# Patient Record
Sex: MALE | Race: WHITE | NOT HISPANIC OR LATINO | Employment: UNEMPLOYED | URBAN - METROPOLITAN AREA
[De-identification: names, ages, dates, MRNs, and addresses within clinical notes are randomized per-mention and may not be internally consistent; named-entity substitution may affect disease eponyms.]

---

## 2017-12-12 ENCOUNTER — RESULTS ENCOUNTER (OUTPATIENT)
Dept: INTERNAL MEDICINE | Facility: CLINIC | Age: 67
End: 2017-12-12

## 2017-12-12 ENCOUNTER — OFFICE VISIT (OUTPATIENT)
Dept: INTERNAL MEDICINE | Facility: CLINIC | Age: 67
End: 2017-12-12

## 2017-12-12 VITALS
DIASTOLIC BLOOD PRESSURE: 76 MMHG | SYSTOLIC BLOOD PRESSURE: 120 MMHG | OXYGEN SATURATION: 96 % | HEIGHT: 69 IN | RESPIRATION RATE: 16 BRPM | TEMPERATURE: 98.2 F | BODY MASS INDEX: 27.73 KG/M2 | WEIGHT: 187.2 LBS | HEART RATE: 78 BPM

## 2017-12-12 DIAGNOSIS — Z12.11 SCREEN FOR COLON CANCER: ICD-10-CM

## 2017-12-12 DIAGNOSIS — Z00.00 ENCOUNTER FOR PREVENTIVE HEALTH EXAMINATION: Primary | ICD-10-CM

## 2017-12-12 DIAGNOSIS — Z13.220 SCREENING FOR LIPID DISORDERS: ICD-10-CM

## 2017-12-12 DIAGNOSIS — Z11.59 NEED FOR HEPATITIS C SCREENING TEST: ICD-10-CM

## 2017-12-12 DIAGNOSIS — M12.811 ROTATOR CUFF ARTHROPATHY, RIGHT: ICD-10-CM

## 2017-12-12 DIAGNOSIS — N40.0 BENIGN PROSTATIC HYPERPLASIA WITHOUT LOWER URINARY TRACT SYMPTOMS: ICD-10-CM

## 2017-12-12 DIAGNOSIS — Q89.01 ASPLENIA: ICD-10-CM

## 2017-12-12 LAB
ALBUMIN SERPL-MCNC: 4.6 G/DL (ref 3.5–5.2)
ALBUMIN/GLOB SERPL: 1.4 G/DL
ALP SERPL-CCNC: 92 U/L (ref 39–117)
ALT SERPL-CCNC: 12 U/L (ref 1–41)
AST SERPL-CCNC: 15 U/L (ref 1–40)
BASOPHILS # BLD AUTO: 0.05 10*3/MM3 (ref 0–0.2)
BASOPHILS NFR BLD AUTO: 0.4 % (ref 0–1.5)
BILIRUB SERPL-MCNC: 0.6 MG/DL (ref 0.1–1.2)
BUN SERPL-MCNC: 20 MG/DL (ref 8–23)
BUN/CREAT SERPL: 21.7 (ref 7–25)
CALCIUM SERPL-MCNC: 9.8 MG/DL (ref 8.6–10.5)
CHLORIDE SERPL-SCNC: 102 MMOL/L (ref 98–107)
CHOLEST SERPL-MCNC: 180 MG/DL (ref 0–200)
CO2 SERPL-SCNC: 25.2 MMOL/L (ref 22–29)
CREAT SERPL-MCNC: 0.92 MG/DL (ref 0.76–1.27)
EOSINOPHIL # BLD AUTO: 0.14 10*3/MM3 (ref 0–0.7)
EOSINOPHIL NFR BLD AUTO: 1.2 % (ref 0.3–6.2)
ERYTHROCYTE [DISTWIDTH] IN BLOOD BY AUTOMATED COUNT: 13.1 % (ref 11.5–14.5)
GFR SERPLBLD CREATININE-BSD FMLA CKD-EPI: 82 ML/MIN/1.73
GFR SERPLBLD CREATININE-BSD FMLA CKD-EPI: 99 ML/MIN/1.73
GLOBULIN SER CALC-MCNC: 3.3 GM/DL
GLUCOSE SERPL-MCNC: 78 MG/DL (ref 65–99)
HCT VFR BLD AUTO: 46.3 % (ref 40.4–52.2)
HDLC SERPL-MCNC: 41 MG/DL (ref 40–60)
HGB BLD-MCNC: 15.1 G/DL (ref 13.7–17.6)
IMM GRANULOCYTES # BLD: 0.02 10*3/MM3 (ref 0–0.03)
IMM GRANULOCYTES NFR BLD: 0.2 % (ref 0–0.5)
LDLC SERPL CALC-MCNC: 120 MG/DL (ref 0–100)
LDLC/HDLC SERPL: 2.92 {RATIO}
LYMPHOCYTES # BLD AUTO: 4.03 10*3/MM3 (ref 0.9–4.8)
LYMPHOCYTES NFR BLD AUTO: 34.7 % (ref 19.6–45.3)
MCH RBC QN AUTO: 31.9 PG (ref 27–32.7)
MCHC RBC AUTO-ENTMCNC: 32.6 G/DL (ref 32.6–36.4)
MCV RBC AUTO: 97.9 FL (ref 79.8–96.2)
MONOCYTES # BLD AUTO: 1.17 10*3/MM3 (ref 0.2–1.2)
MONOCYTES NFR BLD AUTO: 10.1 % (ref 5–12)
NEUTROPHILS # BLD AUTO: 6.22 10*3/MM3 (ref 1.9–8.1)
NEUTROPHILS NFR BLD AUTO: 53.4 % (ref 42.7–76)
PLATELET # BLD AUTO: 328 10*3/MM3 (ref 140–500)
POTASSIUM SERPL-SCNC: 4.4 MMOL/L (ref 3.5–5.2)
PROT SERPL-MCNC: 7.9 G/DL (ref 6–8.5)
PSA SERPL-MCNC: 1.01 NG/ML (ref 0–4)
RBC # BLD AUTO: 4.73 10*6/MM3 (ref 4.6–6)
SODIUM SERPL-SCNC: 141 MMOL/L (ref 136–145)
TRIGL SERPL-MCNC: 96 MG/DL (ref 0–150)
VLDLC SERPL CALC-MCNC: 19.2 MG/DL (ref 5–40)
WBC # BLD AUTO: 11.63 10*3/MM3 (ref 4.5–10.7)

## 2017-12-12 PROCEDURE — 90670 PCV13 VACCINE IM: CPT | Performed by: INTERNAL MEDICINE

## 2017-12-12 PROCEDURE — G0439 PPPS, SUBSEQ VISIT: HCPCS | Performed by: INTERNAL MEDICINE

## 2017-12-12 PROCEDURE — 99387 INIT PM E/M NEW PAT 65+ YRS: CPT | Performed by: INTERNAL MEDICINE

## 2017-12-12 PROCEDURE — G0009 ADMIN PNEUMOCOCCAL VACCINE: HCPCS | Performed by: INTERNAL MEDICINE

## 2017-12-12 NOTE — PROGRESS NOTES
QUICK REFERENCE INFORMATION:  The ABCs of the Annual Wellness Visit    Initial Medicare Wellness Visit    HEALTH RISK ASSESSMENT    1950    Recent Hospitalizations:  No hospitalization(s) within the last year..        Current Medical Providers:  Patient Care Team:  Jake Gongora MD as PCP - Internal Medicine (Internal Medicine)        Smoking Status:  History   Smoking Status   • Never Smoker   Smokeless Tobacco   • Never Used       Alcohol Consumption:  History   Alcohol Use   • Yes     Comment: 2 drinks x month on avg       Depression Screen:   PHQ-2/PHQ-9 Depression Screening 12/12/2017   Little interest or pleasure in doing things 0   Feeling down, depressed, or hopeless 0   Trouble falling or staying asleep, or sleeping too much 0   Feeling tired or having little energy 0   Poor appetite or overeating 0   Feeling bad about yourself - or that you are a failure or have let yourself or your family down 0   Trouble concentrating on things, such as reading the newspaper or watching television 0   Moving or speaking so slowly that other people could have noticed. Or the opposite - being so fidgety or restless that you have been moving around a lot more than usual 0   Thoughts that you would be better off dead, or of hurting yourself in some way 0   Total Score 0       Health Habits and Functional and Cognitive Screening:  Functional & Cognitive Status 12/12/2017   Do you have difficulty preparing food and eating? No   Do you have difficulty bathing yourself, getting dressed or grooming yourself? No   Do you have difficulty using the toilet? No   Do you have difficulty moving around from place to place? No   Do you have trouble with steps or getting out of a bed or a chair? No   In the past year have you fallen or experienced a near fall? No   Current Diet Well Balanced Diet   Dental Exam Not up to date   Eye Exam Up to date   Exercise (times per week) 7 times per week   Current Exercise Activities Include  Walking   Do you need help using the phone?  No   Are you deaf or do you have serious difficulty hearing?  No   Do you need help with transportation? No   Do you need help shopping? No   Do you need help preparing meals?  No   Do you need help with housework?  No   Do you need help with laundry? No   Do you need help taking your medications? No   Do you need help managing money? No   Have you felt unusual stress, anger or loneliness in the last month? No   Who do you live with? Alone   If you need help, do you have trouble finding someone available to you? No   Have you been bothered in the last four weeks by sexual problems? No   Do you have difficulty concentrating, remembering or making decisions? No           Does the patient have evidence of cognitive impairment? No    Asiprin use counseling: Does not need ASA (and currently is not on it)      Recent Lab Results:    Visual Acuity:  No exam data present    Age-appropriate Screening Schedule:  Refer to the list below for future screening recommendations based on patient's age, sex and/or medical conditions. Orders for these recommended tests are listed in the plan section. The patient has been provided with a written plan.    Health Maintenance   Topic Date Due   • PNEUMOCOCCAL VACCINES (65+ LOW/MEDIUM RISK) (1 of 2 - PCV13) 12/31/2014   • ZOSTER VACCINE  12/12/2017   • INFLUENZA VACCINE  Addressed   • TDAP/TD VACCINES  Excluded        Subjective   History of Present Illness    Brijesh Farmer is a 66 y.o. male who presents for an Annual Wellness Visit.    The following portions of the patient's history were reviewed and updated as appropriate: allergies, current medications, past family history, past medical history, past social history, past surgical history and problem list.    No outpatient prescriptions prior to visit.     No facility-administered medications prior to visit.        Patient Active Problem List   Diagnosis   • Asplenia   • Benign prostatic  "hyperplasia without lower urinary tract symptoms       Advance Care Planning:  has NO advance directive - information provided to the patient today    Identification of Risk Factors:  Risk factors include: No risk factors apply.    Review of Systems    Compared to one year ago, the patient feels his physical health is the same.  Compared to one year ago, the patient feels his mental health is the same.    Objective     Physical Exam    Vitals:    12/12/17 1330   BP: 120/76   BP Location: Left arm   Patient Position: Sitting   Pulse: 78   Resp: 16   Temp: 98.2 °F (36.8 °C)   SpO2: 96%   Weight: 84.9 kg (187 lb 3.2 oz)   Height: 175.3 cm (69\")   PainSc: 0-No pain       Body mass index is 27.64 kg/(m^2).  Discussed the patient's BMI with him. BMI is within normal parameters. No follow-up required.    Assessment/Plan   Patient Self-Management and Personalized Health Advice  The patient has been provided with information about: designing advance directives and preventive services including:   · Advance directive.    Visit Diagnoses:    ICD-10-CM ICD-9-CM   1. Encounter for preventive health examination Z00.00 V70.0   2. Asplenia Q89.01 759.0   3. Rotator cuff arthropathy, right M12.811 716.81   4. Benign prostatic hyperplasia without lower urinary tract symptoms N40.0 600.00   5. Screening for lipid disorders Z13.220 V77.91   6. Need for hepatitis C screening test Z11.59 V73.89   7. Screen for colon cancer Z12.11 V76.51       Orders Placed This Encounter   Procedures   • Pneumococcal Conjugate Vaccine 13-Valent All   • Cologuard - Stool, Per Rectum     Standing Status:   Future     Standing Expiration Date:   12/12/2018   • Comprehensive Metabolic Panel   • PSA   • Lipid Panel With LDL / HDL Ratio   • PT Consult: Eval & Treat     Standing Status:   Future     Standing Expiration Date:   12/12/2018     Scheduling Instructions:      Right shoulder rotator cuff sx   • CBC & Differential       No outpatient encounter " prescriptions on file as of 12/12/2017.     No facility-administered encounter medications on file as of 12/12/2017.        Reviewed use of high risk medication in the elderly: yes  Reviewed for potential of harmful drug interactions in the elderly: yes    Follow Up:  Return in about 1 year (around 12/12/2018), or if symptoms worsen or fail to improve.     An After Visit Summary and PPPS with all of these plans were given to the patient.

## 2017-12-12 NOTE — PATIENT INSTRUCTIONS
Pneumococcal Conjugate Vaccine (PCV13)   1. Why get vaccinated?  Vaccination can protect both children and adults from pneumococcal disease.  Pneumococcal disease is caused by bacteria that can spread from person to person through close contact. It can cause ear infections, and it can also lead to more serious infections of the:  · Lungs (pneumonia),  · Blood (bacteremia), and  · Covering of the brain and spinal cord (meningitis).  Pneumococcal pneumonia is most common among adults. Pneumococcal meningitis can cause deafness and brain damage, and it kills about 1 child in 10 who get it.  Anyone can get pneumococcal disease, but children under 2 years of age and adults 65 years and older, people with certain medical conditions, and cigarette smokers are at the highest risk.  Before there was a vaccine, the [place] saw:  · more than 700 cases of meningitis,  · about 13,000 blood infections,  · about 5 million ear infections, and  · about 200 deaths  in children under 5 each year from pneumococcal disease. Since vaccine became available, severe pneumococcal disease in these children has fallen by 88%.  About 18,000 older adults die of pneumococcal disease each year in the [place].  Treatment of pneumococcal infections with penicillin and other drugs is not as effective as it used to be, because some strains of the disease have become resistant to these drugs. This makes prevention of the disease, through vaccination, even more important.  2. PCV13 vaccine  Pneumococcal conjugate vaccine (called PCV13) protects against 13 types of pneumococcal bacteria.  PCV13 is routinely given to children at 2, 4, 6, and 12-15 months of age. It is also recommended for children and adults 2 to 64 years of age with certain health conditions, and for all adults 65 years of age and older. Your doctor can give you details.  3. Some people should not get this vaccine  Anyone who has ever had a life-threatening allergic reaction  to a dose of this vaccine, to an earlier pneumococcal vaccine called PCV7, or to any vaccine containing diphtheria toxoid (for example, DTaP), should not get PCV13.  Anyone with a severe allergy to any component of PCV13 should not get the vaccine. Tell your doctor if the person being vaccinated has any severe allergies.  If the person scheduled for vaccination is not feeling well, your healthcare provider might decide to reschedule the shot on another day.  4. Risks of a vaccine reaction  With any medicine, including vaccines, there is a chance of reactions. These are usually mild and go away on their own, but serious reactions are also possible.  Problems reported following PCV13 varied by age and dose in the series. The most common problems reported among children were:  · About half became drowsy after the shot, had a temporary loss of appetite, or had redness or tenderness where the shot was given.  · About 1 out of 3 had swelling where the shot was given.  · About 1 out of 3 had a mild fever, and about 1 in 20 had a fever over 102.2°F.  · Up to about 8 out of 10 became fussy or irritable.  Adults have reported pain, redness, and swelling where the shot was given; also mild fever, fatigue, headache, chills, or muscle pain.  Young children who get PCV13 along with inactivated flu vaccine at the same time may be at increased risk for seizures caused by fever. Ask your doctor for more information.  Problems that could happen after any vaccine:  · People sometimes faint after a medical procedure, including vaccination. Sitting or lying down for about 15 minutes can help prevent fainting, and injuries caused by a fall. Tell your doctor if you feel dizzy, or have vision changes or ringing in the ears.  · Some older children and adults get severe pain in the shoulder and have difficulty moving the arm where a shot was given. This happens very rarely.  · Any medication can cause a severe allergic reaction. Such  reactions from a vaccine are very rare, estimated at about 1 in a million doses, and would happen within a few minutes to a few hours after the vaccination.  As with any medicine, there is a very small chance of a vaccine causing a serious injury or death.  The safety of vaccines is always being monitored. For more information, visit: www.cdc.gov/vaccinesafety/  5. What if there is a serious reaction?  What should I look for?  · Look for anything that concerns you, such as signs of a severe allergic reaction, very high fever, or unusual behavior.  Signs of a severe allergic reaction can include hives, swelling of the face and throat, difficulty breathing, a fast heartbeat, dizziness, and weakness-usually within a few minutes to a few hours after the vaccination.  What should I do?  · If you think it is a severe allergic reaction or other emergency that can't wait, call 9-1-1 or get the person to the nearest hospital. Otherwise, call your doctor.  Reactions should be reported to the Vaccine Adverse Event Reporting System (VAERS). Your doctor should file this report, or you can do it yourself through the VAERS web site at www.vaers.[facility].gov, or by calling 1-898.280.3887.  VAERS does not give medical advice.  6. The National Vaccine Injury Compensation Program  The National Vaccine Injury Compensation Program (VICP) is a federal program that was created to compensate people who may have been injured by certain vaccines.  Persons who believe they may have been injured by a vaccine can learn about the program and about filing a claim by calling 1-220.797.3791 or visiting the VICP website at www.hrsa.gov/vaccinecompensation. There is a time limit to file a claim for compensation.  7. How can I learn more?  · Ask your healthcare provider. He or she can give you the vaccine package insert or suggest other sources of information.  · Call your local or state health department.  · Contact the Centers for Disease Control and  Prevention (CDC):    Call 1-409.598.5889 (1-800-CDC-INFO) or    Visit CDC's website at www.cdc.gov/vaccines  Vaccine Information Statement  PCV13 Vaccine (11/05/2015)     This information is not intended to replace advice given to you by your health care provider. Make sure you discuss any questions you have with your health care provider.     Document Released: 10/15/2007 Document Revised: 01/08/2016 Document Reviewed: 11/12/2015  [employer] Interactive Patient Education ©2017 [employer]  Advance Directive  Advance directives are the legal documents that allow you to make choices about your health care and medical treatment if you cannot speak for yourself. Advance directives are a way for you to communicate your wishes to family, friends, and health care providers. The specified people can then convey your decisions about end-of-life care to avoid confusion if you should become unable to communicate.  Ideally, the process of discussing and writing advance directives should happen over time rather than making decisions all at once. Advance directives can be modified as your situation changes, and you can change your mind at any time, even after you have signed the advance directives. Each state has its own laws regarding advance directives.  You may want to check with your health care provider, , or state representative about the law in your state. Below are some examples of advance directives.  HEALTH CARE PROXY AND DURABLE POWER OF  FOR HEALTH CARE  A health care proxy is a person (agent) appointed to make medical decisions for you if you cannot. Generally, people choose someone they know well and trust to represent their preferences when they can no longer do so. You should be sure to ask this person for agreement to act as your agent. An agent may have to exercise judgment in the event of a medical decision for which your wishes are not known.  A durable power of  for health care is a  legal document that names your health care proxy. Depending on the laws in your state, after the document is written, it may also need to be:  · Signed.  · Notarized.  · Dated.  · Copied.  · Witnessed.  · Incorporated into your medical record.  You may also want to appoint someone to manage your financial affairs if you cannot. This is called a durable power of  for finances. It is a separate legal document from the durable power of  for health care. You may choose the same person or someone different from your health care proxy to act as your agent in financial matters.  LIVING WILL  A living will is a set of instructions documenting your wishes about medical care when you cannot care for yourself. It is used if you become:  · Terminally ill.  · Incapacitated.  · Unable to communicate.  · Unable to make decisions.  Items to consider in your living will include:  · The use or non-use of life-sustaining equipment, such as dialysis machines and breathing machines (ventilators).  · A do not resuscitate (DNR) order, which is the instruction not to use cardiopulmonary resuscitation (CPR) if breathing or heartbeat stops.  · Tube feeding.  · Withholding of food and fluids.  · Comfort (palliative) care when the goal becomes comfort rather than a cure.  · Organ and tissue donation.  A living will does not give instructions about distribution of your money and property if you should pass away. It is advisable to seek the expert advice of a  in drawing up a will regarding your possessions. Decisions about taxes, beneficiaries, and asset distribution will be legally binding. This process can relieve your family and friends of any burdens surrounding disputes or questions that may come up about the allocation of your assets.  DO NOT RESUSCITATE (DNR)  A do not resuscitate (DNR) order is a request to not have CPR in the event that your heart stops beating or you stop breathing. Unless given other  instructions, a health care provider will try to help any patient whose heart has stopped or who has stopped breathing.   This information is not intended to replace advice given to you by your health care provider. Make sure you discuss any questions you have with your health care provider.  Document Released: 03/26/2009 Document Revised: 04/10/2017 Document Reviewed: 05/07/2014  [employer] Interactive Patient Education © 2017 [employer]

## 2017-12-14 ENCOUNTER — HOSPITAL ENCOUNTER (OUTPATIENT)
Dept: PHYSICAL THERAPY | Facility: HOSPITAL | Age: 67
Setting detail: THERAPIES SERIES
Discharge: HOME | End: 2017-12-14
Attending: INTERNAL MEDICINE

## 2017-12-14 DIAGNOSIS — M75.41 IMPINGEMENT SYNDROME, SHOULDER, RIGHT: Primary | ICD-10-CM

## 2017-12-14 DIAGNOSIS — M25.511 RIGHT SHOULDER PAIN, UNSPECIFIED CHRONICITY: ICD-10-CM

## 2017-12-14 DIAGNOSIS — M12.811 ROTATOR CUFF ARTHROPATHY, RIGHT: ICD-10-CM

## 2017-12-14 PROCEDURE — 97161 PT EVAL LOW COMPLEX 20 MIN: CPT

## 2017-12-14 PROCEDURE — G8985 CARRY GOAL STATUS: HCPCS

## 2017-12-14 PROCEDURE — G8984 CARRY CURRENT STATUS: HCPCS

## 2017-12-14 NOTE — THERAPY EVALUATION
Outpatient Physical Therapy Ortho Initial Evaluation  [facility]     Patient Name: Brijesh Farmer  : 1950  MRN: [id number]  Today's Date: 2017      Visit Date: 2017    Patient Active Problem List   Diagnosis   • Asplenia   • Benign prostatic hyperplasia without lower urinary tract symptoms        Past Medical History:   Diagnosis Date   • Arm pain    • Vision changes    • Weight gain         Past Surgical History:   Procedure Laterality Date   • KNEE ACL RECONSTRUCTION Left        Visit Dx:     ICD-10-CM ICD-9-CM   1. Impingement syndrome, shoulder, right M75.41 726.2   2. Rotator cuff arthropathy, right M12.811 716.81   3. Right shoulder pain, unspecified chronicity M25.511 719.41             Patient History       17 1300          History    Chief Complaint Pain  -      Type of Pain Upper Extremity / Arm  -      Date Current Problem(s) Began --   began 6-9 months ago  -      Brief Description of Current Complaint Pain in biceps for many months.  Suspects when he descended spiral staircase he thought he was at the last step but wasn't and he fell hitting his shoulder on a hot tub.  Shoulder was no worse at the time but gradually began having pain that worsened.  Slept previously on R side but has not been as much recently because of pain--he has to be careful of where he places R shoulder because of the pain.  Activities he is avoiding with R shoulder: throwing a ball hurts--tends to avoid.  Felt some pain golfing.  Stopped doing 50 push-ups a day.  Does daily cross-body toe touches 100 times.  Had previously been more active and wants to be more active.  Reports a shoulder separation 35 years ago playing softball. Pt was  2 years ago.  -      Hand Dominance right-handed  -JA      Occupation/sports/leisure activities golfs, walks, likes to hike  -      Pain     Pain Location Shoulder   upper arm referral region  -      Pain at Present 0  -      Pain at Best  0  -JA      Pain at Worst 5  -JA      Pain Frequency Intermittent  -JA      What Performance Factors Make the Current Problem(s) WORSE? reaching back, reaching across  -JA      Is your sleep disturbed? Yes  -JA      Fall Risk Assessment    Any falls in the past year: Yes  -JA      Number of falls reported in the last 12 months 1  -JA      Factors that contributed to the fall: Other (comment)  -JA      Safety    Are you being hurt, hit, or frightened by anyone at home or in your life? No  -JA      Are you being neglected by a caregiver No  -JA        User Key  (r) = Recorded By, (t) = Taken By, (c) = Cosigned By    Initials Name Provider Type    ROSS Salguero, PT Physical Therapist                PT Ortho       12/14/17 1300    Posture/Observations    Alignment Options Scapular winging   R  -JA    Special Tests/Palpation    Special Tests/Palpation Shoulder  -JA    Shoulder Girdle Palpation    Shoulder Girdle Palpation? --   Supraspinatus tendon point tender  -JA    Shoulder Impingement/Rotator Cuff Special Tests    Steward-Dieter Test (RC Lesion vs. Bursitis) Right:;Positive   mildly  -    Neer Impingement Test (RC Lesion vs. Bursitis) Right:;Positive  -JA    Full Can Test (RC Lesion) Right:;Positive  -JA    Empty Can Test (RC Lesion) Right:;Positive  -JA    Drop Arm Test (Full Thickness RC Lesion) Right:;Negative  -JA    ROM (Range of Motion)    General ROM upper extremity range of motion deficits identified  -JA    Right Shoulder    Flexion AROM Deficit 125   L:145  -JA    Flexion PROM Deficit 140   L: 165  -JA    ABduction AROM Deficit 130   L:150  -JA    ABduction PROM Deficit 140   L 165  -JA    External Rotation AROM Deficit reaches C5   L WFL  -JA    External Rotation PROM Deficit 60   L75  -JA    Internal Rotation AROM Deficit L1   L T5  -JA    Internal Rotation PROM Deficit 55   L WNL  -JA    General UE Assessment    ROM shoulder, right: UE ROM deficit  -JA    MMT (Manual Muscle Testing)     General MMT Assessment upper extremity strength deficits identified  -    General MMT Assessment Detail R rhomboids NT couldn't position, mid T 3-, LT 3-   -JA    Right Shoulder    Flexion Gross Movement (3+/5) fair plus  -JA    ABduction Gross Movement (3+/5) fair plus  -JA    Int Rotation Gross Movement (3/5) fair  -JA    Ext Rotation Gross Movement (3/5) fair  -JA    Upper Extremity    Upper Ext Manual Muscle Testing right shoulder strength deficit  -      User Key  (r) = Recorded By, (t) = Taken By, (c) = Cosigned By    Initials Name Provider Type    ROSS Salguero, PT Physical Therapist                                  PT OP Goals       12/14/17 1600       PT Short Term Goals    STG Date to Achieve 12/28/17  -JA     STG 1 Patient will be independent and compliant with initial HEP   -     STG 1 Progress New  -     STG 2 Pt will demonstrate optimal shoulder-scapula postural alignment to reduce shoulder strain/pain.  -     STG 2 Progress New  -     STG 3 Pt will reports 10% decrease in pain at night.  -     STG 3 Progress New  -     Long Term Goals    LTG Date to Achieve 01/14/18  -JA     LTG 1 Pt will be independent with advanced HEP for shoulder strengthening/stabilization.  -JA     LTG 1 Progress New  -     LTG 2 Pt will report decrease in pain and sleep disruption at night by 50%.  -     LTG 2 Progress New  -     LTG 3 Education for posture and body mechanics with home and work to reduce strain on shoulder.  -     LTG 3 Progress New  -     LTG 4 Pt will be able to throw a ball with no more than 1/10 pain.  -     LTG 4 Progress New  -     LTG 5 Pt will be able to reach behind back for ease of dressing/grooming.  -     LTG 5 Progress New  -     Time Calculation    PT Goal Re-Cert Due Date 03/14/18  -       User Key  (r) = Recorded By, (t) = Taken By, (c) = Cosigned By    Initials Name Provider Type    ROSS Salguero, PT Physical Therapist                PT  Assessment/Plan       12/14/17 4411       PT Assessment    Functional Limitations Limitation in home management;Limitations in community activities;Limitations in functional capacity and performance;Performance in leisure activities;Performance in self-care ADL;Performance in sport activities  -ROSS     Impairments Pain;Range of motion;Muscle strength;Posture;Poor body mechanics  -ROSS     Assessment Comments Pt is a 67 y/o male with stable clinical presentation of R arm pain referred from [facility].  He has poor posture with R scapular winging, positive impingement signs, decreased A/PROM, and weakness in R shoulder.  His pain and decreased ROM limit his functional use of R shoulder for dressing/grooming and restricts his participation in social activities such as playing ball with grandchild or friends or other throwing sports.  He would benefit from skilled therapy services to address problem list and facilitate return to previous level of function.  -ROSS     Please refer to paper survey for additional self-reported information Yes  -ROSS     Rehab Potential Excellent  -ROSS     Patient/caregiver participated in establishment of treatment plan and goals Yes  -ROSS     Patient would benefit from skilled therapy intervention Yes  -ROSS     PT Plan    PT Frequency 1x/week  -ROSS     Predicted Duration of Therapy Intervention (days/wks) 6 weeks  -ROSS     Planned CPT's? PT EVAL MOD COMPLELITY: 52866;PT THER PROC EA 15 MIN: 97438;PT MANUAL THERAPY EA 15 MIN: 50896;PT NEUROMUSC RE-EDUCATION EA 15 MIN: 23877;PT SELF CARE/HOME MGMT/TRAIN EA 15: 97234;PT HOT OR COLD PACK TREAT MCARE;PT ELECTRICAL STIM UNATTEND:   -ROSS     PT Plan Comments review HEP, reinforce posture and scapular-humeral alignment, progress with scapular strengthening with resistance and wts, PNF, mods prn, manual techniques  -ROSS       User Key  (r) = Recorded By, (t) = Taken By, (c) = Cosigned By    Initials Name Provider Type    ROSS Salguero, PT Physical  Therapist                              Outcome Measure Options: Disabilities of the Arm, Shoulder, and Hand (DASH)  DASH  Open a tight or new jar.: No Difficulty  Write: No Difficulty  Turn a key: No Difficulty  Prepare a meal: No Difficulty  Push open a heavy door: No Difficulty  Place an object on a shelf above your head: Mild Difficulty  Do heavy household chores (e.g., wash walls, wash floors): No Difficulty  Garden or do yard work: No Difficulty  Make a bed: No Difficulty  Carry a shopping bag or briefcase: No Difficulty  Carry a heavy object (over 10 lbs): No Difficulty  Change a lightbulb overhead: No Difficulty  Wash or blow dry your hair: No Difficulty  Wash your back: Mild Difficulty  Put on a pullover sweater: No Difficulty  Use a knife to cut food: No Difficulty  Recreational activities in which require little effort (e.g., cardplaying, knitting, etc.): No Difficulty  Recreational activities in which you take some force or impact through your arm, should or hand (e.g. golf, hammering, tennis, etc.): Mild Difficulty  Recreational Activities in which you move your arm freely (e.g., frisbee, badminton, etc.): No Difficulty  Manage transportation needs (getting from one place to another): No Difficulty  Sexual Activities: No Difficulty  During the past week, to what extent has your arm, shoulder, or hand problem interfered with your normal social activites with family, friends, neighbors or groups?: Not at all  During the past week, were you limited in your work or other regular daily activities as a result of your arm, shoulder or hand problem?: Slightly Limited  Arm, Shoulder, or hand pain: None  Arm, shoulder or hand pain when you performed any specific activity: Mild  Tingling (pins and needles) in your arm, shoulder, or hand: None  Weakness in your arm, shoulder or hand: None  Stiffness in your arm, shoulder or hand: None  During the past week, how much difficulty have you had sleeping because of the  pain in your arm, shoulder or hand?: Moderate Difficiculty  I feel less capable, less confident or less useful because of my arm, shoulder or hand problem: Strongly disagree  DASH Sum : 37  Number of Questions Answered: 30  DASH Score: 5.83         Time Calculation:   Start Time: 1315  Stop Time: 1400  Time Calculation (min): 45 min     Therapy Charges for Today     Code Description Service Date Service Provider Modifiers Qty    63612839700 HC PT CARRY MOV HAND OBJ CURRENT 12/14/2017 Melyssa Salguero, PT GP, CI 1    43990798699 HC PT CARRY MOV HAND OBJ PROJECTED 12/14/2017 Melyssa Salguero, PT GP,  1    67396128239 HC PT EVAL LOW COMPLEXITY 3 12/14/2017 Melyssa Salguero, PT GP 1          PT G-Codes  PT Professional Judgement Used?: Yes  Outcome Measure Options: Disabilities of the Arm, Shoulder, and Hand (DASH)  Score: 5.83  Functional Limitation: Carrying, moving and handling objects  Carrying, Moving and Handling Objects Current Status (): At least 1 percent but less than 20 percent impaired, limited or restricted  Carrying, Moving and Handling Objects Goal Status (): 0 percent impaired, limited or restricted         Melyssa Salguero, PT  12/14/2017

## 2017-12-18 ENCOUNTER — HOSPITAL ENCOUNTER (OUTPATIENT)
Dept: PHYSICAL THERAPY | Facility: HOSPITAL | Age: 67
Setting detail: THERAPIES SERIES
Discharge: HOME | End: 2017-12-18

## 2017-12-18 DIAGNOSIS — M25.511 RIGHT SHOULDER PAIN, UNSPECIFIED CHRONICITY: ICD-10-CM

## 2017-12-18 DIAGNOSIS — M75.41 IMPINGEMENT SYNDROME, SHOULDER, RIGHT: Primary | ICD-10-CM

## 2017-12-18 DIAGNOSIS — M12.811 ROTATOR CUFF ARTHROPATHY, RIGHT: ICD-10-CM

## 2017-12-18 PROCEDURE — 97110 THERAPEUTIC EXERCISES: CPT

## 2017-12-18 PROCEDURE — 97140 MANUAL THERAPY 1/> REGIONS: CPT

## 2017-12-18 NOTE — THERAPY TREATMENT NOTE
Outpatient Physical Therapy Ortho Treatment Note  [facility]     Patient Name: Brijesh Farmer  : 1950  MRN: [id number]  Today's Date: 2017      Visit Date: 2017    Visit Dx:    ICD-10-CM ICD-9-CM   1. Impingement syndrome, shoulder, right M75.41 726.2   2. Rotator cuff arthropathy, right M12.811 716.81   3. Right shoulder pain, unspecified chronicity M25.511 719.41       Patient Active Problem List   Diagnosis   • Asplenia   • Benign prostatic hyperplasia without lower urinary tract symptoms        Past Medical History:   Diagnosis Date   • Arm pain    • Vision changes    • Weight gain         Past Surgical History:   Procedure Laterality Date   • KNEE ACL RECONSTRUCTION Left                              PT Assessment/Plan       17 1038       PT Assessment    Assessment Comments Added scap ret with GTB to HEP. Toleraint HEP well. Will consider adding weight to scap punch next vist  -WS       User Key  (r) = Recorded By, (t) = Taken By, (c) = Cosigned By    Initials Name Provider Type    SHAYNE Car PTA Physical Therapy Assistant                    Exercises       17 1015          Subjective Comments    Subjective Comments min shoulder pain  -      Subjective Pain    Able to rate subjective pain? yes  -WS      Subjective Pain Comment reports improvement  -      Exercise 1    Exercise Name 1 scap ret  -WS      Reps 1 10  -WS      Exercise 2    Exercise Name 2 shld rolls  -WS      Reps 2 10  -WS      Exercise 3    Exercise Name 3 S/L ER  -WS      Reps 3 10  -WS      Exercise 4    Exercise Name 4 shoulder punch  -      Reps 4 10  -WS      Exercise 5    Exercise Name 5 supine shoulder flex with cane  -      Reps 5 10  -        User Key  (r) = Recorded By, (t) = Taken By, (c) = Cosigned By    Initials Name Provider Type    SHAYNE Car PTA Physical Therapy Assistant                        Manual Rx (last 36 hours)      Manual Treatments       17  1036          Manual Rx 1    Manual Rx 1 Location PROM , mobs ,occilations  -      Manual Rx 2    Manual Rx 2 Location rhythmic stab at 90  -        User Key  (r) = Recorded By, (t) = Taken By, (c) = Cosigned By    Initials Name Provider Type    SHAYNE Car PTA Physical Therapy Assistant                PT OP Goals       12/18/17 1000       PT Short Term Goals    STG Date to Achieve 12/28/17  -     STG 1 Patient will be independent and compliant with initial HEP   -WS     STG 1 Progress Ongoing  -     STG 2 Pt will demonstrate optimal shoulder-scapula postural alignment to reduce shoulder strain/pain.  -WS     STG 2 Progress Ongoing  -     STG 3 Pt will reports 10% decrease in pain at night.  -     STG 3 Progress Ongoing  -     Long Term Goals    LTG Date to Achieve 01/14/18  -     LTG 1 Pt will be independent with advanced HEP for shoulder strengthening/stabilization.  -     LTG 1 Progress New  -     LTG 2 Pt will report decrease in pain and sleep disruption at night by 50%.  -     LTG 2 Progress New  -     LTG 3 Education for posture and body mechanics with home and work to reduce strain on shoulder.  -     LTG 3 Progress New  -     LTG 4 Pt will be able to throw a ball with no more than 1/10 pain.  -     LTG 4 Progress New  -     LTG 5 Pt will be able to reach behind back for ease of dressing/grooming.  -     LTG 5 Progress New  -       User Key  (r) = Recorded By, (t) = Taken By, (c) = Cosigned By    Initials Name Provider Type    SHAYNE Car PTA Physical Therapy Assistant          Therapy Education  Given: HEP, Symptoms/condition management, Pain management, Posture/body mechanics  Program: Reinforced  How Provided: Verbal  Provided to: Patient              Time Calculation:   Start Time: 1015  Stop Time: 1045  Time Calculation (min): 30 min    Therapy Charges for Today     Code Description Service Date Service Provider Modifiers Qty    01162556868 HC PT  MANUAL THERAPY EA 15 MIN 12/18/2017 Vimal Car PTA GP 1    [id number]  PT THER PROC EA 15 MIN 12/18/2017 Vimal Car PTA GP 1                    Vimal Car PTA  12/18/2017

## 2017-12-28 ENCOUNTER — HOSPITAL ENCOUNTER (OUTPATIENT)
Dept: PHYSICAL THERAPY | Facility: HOSPITAL | Age: 67
Setting detail: THERAPIES SERIES
Discharge: HOME | End: 2017-12-28

## 2017-12-28 DIAGNOSIS — M75.41 IMPINGEMENT SYNDROME, SHOULDER, RIGHT: Primary | ICD-10-CM

## 2017-12-28 DIAGNOSIS — M12.811 ROTATOR CUFF ARTHROPATHY, RIGHT: ICD-10-CM

## 2017-12-28 DIAGNOSIS — M25.511 RIGHT SHOULDER PAIN, UNSPECIFIED CHRONICITY: ICD-10-CM

## 2017-12-28 PROCEDURE — 97110 THERAPEUTIC EXERCISES: CPT

## 2017-12-28 NOTE — THERAPY TREATMENT NOTE
Outpatient Physical Therapy Ortho Treatment Note  [facility]     Patient Name: Brijesh Farmer  : 1950  MRN: [id number]  Today's Date: 2017      Visit Date: 2017    Visit Dx:    ICD-10-CM ICD-9-CM   1. Impingement syndrome, shoulder, right M75.41 726.2   2. Rotator cuff arthropathy, right M12.811 716.81   3. Right shoulder pain, unspecified chronicity M25.511 719.41       Patient Active Problem List   Diagnosis   • Asplenia   • Benign prostatic hyperplasia without lower urinary tract symptoms        Past Medical History:   Diagnosis Date   • Arm pain    • Vision changes    • Weight gain         Past Surgical History:   Procedure Laterality Date   • KNEE ACL RECONSTRUCTION Left                              PT Assessment/Plan       17 1259       PT Assessment    Assessment Comments patient able to sleep on right arm with no issues. Improving with postural awareness. Right shoulder IR at 90 painful and limited  -WS       User Key  (r) = Recorded By, (t) = Taken By, (c) = Cosigned By    Initials Name Provider Type    SHAYNE Car PTA Physical Therapy Assistant                    Exercises       17 1230          Subjective Comments    Subjective Comments no change in the shoulder  -WS      Subjective Pain    Able to rate subjective pain? yes  -WS      Pre-Treatment Pain Level 0  -WS      Exercise 1    Exercise Name 1 scap ret  -WS      Reps 1 15  -WS      Additional Comments GTB  -WS      Exercise 2    Exercise Name 2 shld rolls  -WS      Reps 2 15  -WS      Additional Comments 3#  -WS      Exercise 3    Exercise Name 3 S/L ER  -WS      Sets 3 2  -WS      Reps 3 10  -WS      Additional Comments 1#  -WS      Exercise 4    Exercise Name 4 shoulder punch  -WS      Reps 4 15  -WS      Additional Comments 3  -WS      Exercise 5    Exercise Name 5 supine shoulder flex with cane  -WS      Reps 5 10  -WS      Exercise 6    Exercise Name 6 UBE  -WS      Time (Minutes) 6 3  -WS         User Key  (r) = Recorded By, (t) = Taken By, (c) = Cosigned By    Initials Name Provider Type     Vimal Car PTA Physical Therapy Assistant                        Manual Rx (last 36 hours)      Manual Treatments       12/28/17 1100          Manual Rx 1    Manual Rx 1 Location PROM , mobs ,occilations  -      Manual Rx 2    Manual Rx 2 Location rhythmic stab at 90  -        User Key  (r) = Recorded By, (t) = Taken By, (c) = Cosigned By    Initials Name Provider Type     Vimal Car PTA Physical Therapy Assistant                PT OP Goals       12/28/17 1200       PT Short Term Goals    STG Date to Achieve 12/28/17  -WS     STG 1 Patient will be independent and compliant with initial HEP   -     STG 1 Progress Met  -     STG 2 Pt will demonstrate optimal shoulder-scapula postural alignment to reduce shoulder strain/pain.  -WS     STG 2 Progress Ongoing  -     STG 3 Pt will reports 10% decrease in pain at night.  -WS     STG 3 Progress Ongoing  -     Long Term Goals    LTG Date to Achieve 01/14/18  -     LTG 1 Pt will be independent with advanced HEP for shoulder strengthening/stabilization.  -WS     LTG 1 Progress New  -     LTG 2 Pt will report decrease in pain and sleep disruption at night by 50%.  -     LTG 2 Progress New  -     LTG 3 Education for posture and body mechanics with home and work to reduce strain on shoulder.  -     LTG 3 Progress New  -     LTG 4 Pt will be able to throw a ball with no more than 1/10 pain.  -     LTG 4 Progress New  -     LTG 5 Pt will be able to reach behind back for ease of dressing/grooming.  -     LTG 5 Progress New  -       User Key  (r) = Recorded By, (t) = Taken By, (c) = Cosigned By    Initials Name Provider Type    SHAYNE Car PTA Physical Therapy Assistant          Therapy Education  Given: HEP, Symptoms/condition management, Pain management, Posture/body mechanics  Program: Reinforced  How Provided:  Verbal  Provided to: Patient  Level of Understanding: Teach back education performed              Time Calculation:   Start Time: 1230  Stop Time: 1300  Time Calculation (min): 30 min    Therapy Charges for Today     Code Description Service Date Service Provider Modifiers Qty    34589259501 HC PT THER PROC EA 15 MIN 12/28/2017 Vimal Car PTA GP 2                    Vimal Car PTA  12/28/2017

## 2018-01-03 ENCOUNTER — HOSPITAL ENCOUNTER (OUTPATIENT)
Dept: PHYSICAL THERAPY | Facility: HOSPITAL | Age: 68
Setting detail: THERAPIES SERIES
Discharge: HOME | End: 2018-01-03

## 2018-01-03 DIAGNOSIS — M75.41 IMPINGEMENT SYNDROME, SHOULDER, RIGHT: Primary | ICD-10-CM

## 2018-01-03 DIAGNOSIS — M25.511 RIGHT SHOULDER PAIN, UNSPECIFIED CHRONICITY: ICD-10-CM

## 2018-01-03 DIAGNOSIS — M12.811 ROTATOR CUFF ARTHROPATHY, RIGHT: ICD-10-CM

## 2018-01-03 PROCEDURE — 97112 NEUROMUSCULAR REEDUCATION: CPT

## 2018-01-03 PROCEDURE — 97110 THERAPEUTIC EXERCISES: CPT

## 2018-01-03 PROCEDURE — 97140 MANUAL THERAPY 1/> REGIONS: CPT

## 2018-01-03 NOTE — THERAPY TREATMENT NOTE
Outpatient Physical Therapy Ortho Treatment Note  [facility]     Patient Name: Brijesh Farmer  : 1950  MRN: [id number]  Today's Date: 1/3/2018      Visit Date: 2018    Visit Dx:    ICD-10-CM ICD-9-CM   1. Impingement syndrome, shoulder, right M75.41 726.2   2. Rotator cuff arthropathy, right M12.811 716.81   3. Right shoulder pain, unspecified chronicity M25.511 719.41       Patient Active Problem List   Diagnosis   • Asplenia   • Benign prostatic hyperplasia without lower urinary tract symptoms        Past Medical History:   Diagnosis Date   • Arm pain    • Vision changes    • Weight gain         Past Surgical History:   Procedure Laterality Date   • KNEE ACL RECONSTRUCTION Left              PT Ortho       18 1200    Right Shoulder    Flexion AROM Deficit 135  -ROSS      User Key  (r) = Recorded By, (t) = Taken By, (c) = Cosigned By    Initials Name Provider Type    ROSS Salguero, PT Physical Therapist                            PT Assessment/Plan       18 1100       PT Assessment    Assessment Comments Pt was substituting with pec during serratus anterior punch in supine.  Pt has deficient awareness of substitution or compensation for shoulder and scapular weakness.  -ROSS       User Key  (r) = Recorded By, (t) = Taken By, (c) = Cosigned By    Initials Name Provider Type    ROSS Salguero, PT Physical Therapist                    Exercises       18 1100          Subjective Comments    Subjective Comments A little better, still hurts it I move it in certain directions.  Not as bad putting on coat. Can sleep on it.  -ROSS      Subjective Pain    Able to rate subjective pain? yes  -ROSS      Pre-Treatment Pain Level 1  -JA      Exercise 1    Exercise Name 1 scap ret  -JA      Reps 1 20  -JA      Additional Comments GTB  -JA      Exercise 2    Exercise Name 2 shld rolls  -ROSS      Cueing 2 Verbal  -JA      Reps 2 20  -JA      Additional Comments 3#  -JA      Exercise 3     Exercise Name 3 S/L ER  -JA      Cueing 3 Tactile  -JA      Sets 3 2  -JA      Reps 3 10  -JA      Additional Comments 2#  -JA      Exercise 4    Exercise Name 4 shoulder punch  -JA      Cueing 4 Tactile  -JA      Reps 4 15  -JA      Additional Comments 3#, guided pt's arm to reduce pec substitution  -JA      Exercise 5    Exercise Name 5 supine shoulder flex with cane  -JA      Cueing 5 Verbal  -JA      Reps 5 10  -JA      Exercise 6    Exercise Name 6 UBE  -JA      Time (Minutes) 6 4  -JA      Exercise 7    Exercise Name 7 Lat pull down  -JA      Cueing 7 Demo  -JA      Reps 7 10  -JA      Additional Comments green, limited shoulder to neutral instead of going into ext to prevent pain  -JA      Exercise 8    Exercise Name 8 Biceps curls with scap retract positioning  -JA      Cueing 8 Demo  -JA      Reps 8 20  -JA      Additional Comments 3#; to reinforce good posture  -JA      Exercise 9    Exercise Name 9 danae HZ abd w/yellow  -JA      Cueing 9 Demo  -JA      Reps 9 15  -JA      Additional Comments yellow  -JA        User Key  (r) = Recorded By, (t) = Taken By, (c) = Cosigned By    Initials Name Provider Type    ROSS Salguero, PT Physical Therapist                        Manual Rx (last 36 hours)      Manual Treatments       01/03/18 1100          Manual Rx 1    Manual Rx 1 Location R shoulder in supine  -JA      Manual Rx 1 Type PROM, gh jt mobs, oscillations, PNF D2  -JA      Manual Rx 2    Manual Rx 2 Location R shoulder SL  -JA      Manual Rx 2 Type pnf scap ret-protract, rhythmic stab at 90 in SL and in supine  -JA        User Key  (r) = Recorded By, (t) = Taken By, (c) = Cosigned By    Initials Name Provider Type    ROSS Salguero, PT Physical Therapist                PT OP Goals       01/03/18 1100       PT Short Term Goals    STG Date to Achieve 12/28/17  -JA     STG 1 Patient will be independent and compliant with initial HEP   -JA     STG 1 Progress Met  -JA     STG 2 Pt will  "demonstrate optimal shoulder-scapula postural alignment to reduce shoulder strain/pain.  -     STG 2 Progress Ongoing  -     STG 3 Pt will reports 10% decrease in pain at night.  -     STG 3 Progress Met  -     Long Term Goals    LTG Date to Achieve 01/14/18  -     LTG 1 Pt will be independent with advanced HEP for shoulder strengthening/stabilization.  -     LTG 1 Progress New  -     LTG 2 Pt will report decrease in pain and sleep disruption at night by 50%.  -     LTG 2 Progress New  -     LTG 3 Education for posture and body mechanics with home and work to reduce strain on shoulder.  -     LTG 3 Progress New  -     LTG 4 Pt will be able to throw a ball with no more than 1/10 pain.  -     LTG 4 Progress New  -     LTG 5 Pt will be able to reach behind back for ease of dressing/grooming.  -     LTG 5 Progress New  -       User Key  (r) = Recorded By, (t) = Taken By, (c) = Cosigned By    Initials Name Provider Type    ROSS Salguero PT Physical Therapist          Therapy Education  Education Details: can increase reps at home; added lat pull down to clinic ex's; had to correct for too much movement for serratus punch to reduce pec substitution; added supine hz abd with yellow tband to HEP and took out \"hitchhiker\" ex since he is doing SL ER w/wt.  Given: Symptoms/condition management, HEP, Posture/body mechanics  Program: Progressed  How Provided: Verbal, Demonstration, Written  Provided to: Patient  Level of Understanding: Teach back education performed              Time Calculation:   Start Time: 1130  Stop Time: 1215  Time Calculation (min): 45 min    Therapy Charges for Today     Code Description Service Date Service Provider Modifiers Qty    81980395347 HC PT THER PROC EA 15 MIN 1/3/2018 Melyssa Salguero, PT GP 1    80237396011 HC PT NEUROMUSC RE EDUCATION EA 15 MIN 1/3/2018 Melyssa Salguero, PT GP 1    04816645144 HC PT MANUAL THERAPY EA 15 MIN 1/3/2018 Melyssa GREY" Jose M, PT GP 1                    Melyssa Salguero, PT  1/3/2018

## 2018-01-10 ENCOUNTER — HOSPITAL ENCOUNTER (OUTPATIENT)
Dept: PHYSICAL THERAPY | Facility: HOSPITAL | Age: 68
Setting detail: THERAPIES SERIES
Discharge: HOME | End: 2018-01-10

## 2018-01-10 DIAGNOSIS — M75.41 IMPINGEMENT SYNDROME, SHOULDER, RIGHT: Primary | ICD-10-CM

## 2018-01-10 DIAGNOSIS — M25.511 RIGHT SHOULDER PAIN, UNSPECIFIED CHRONICITY: ICD-10-CM

## 2018-01-10 DIAGNOSIS — M12.811 ROTATOR CUFF ARTHROPATHY, RIGHT: ICD-10-CM

## 2018-01-10 PROCEDURE — 97110 THERAPEUTIC EXERCISES: CPT

## 2018-01-10 NOTE — THERAPY TREATMENT NOTE
Outpatient Physical Therapy Ortho Treatment Note  [facility]     Patient Name: Brijesh Farmer  : 1950  MRN: [id number]  Today's Date: 1/10/2018      Visit Date: 01/10/2018    Visit Dx:    ICD-10-CM ICD-9-CM   1. Impingement syndrome, shoulder, right M75.41 726.2   2. Rotator cuff arthropathy, right M12.811 716.81   3. Right shoulder pain, unspecified chronicity M25.511 719.41       Patient Active Problem List   Diagnosis   • Asplenia   • Benign prostatic hyperplasia without lower urinary tract symptoms        Past Medical History:   Diagnosis Date   • Arm pain    • Vision changes    • Weight gain         Past Surgical History:   Procedure Laterality Date   • KNEE ACL RECONSTRUCTION Left                              PT Assessment/Plan       01/10/18 1126       PT Assessment    Assessment Comments Continue to work on postural awareness. Continue to work on strengthening scap/RTC  -WS       User Key  (r) = Recorded By, (t) = Taken By, (c) = Cosigned By    Initials Name Provider Type    SHAYNE Car PTA Physical Therapy Assistant                    Exercises       01/10/18 1100          Subjective Comments    Subjective Comments Shoulder slowly improving  -WS      Subjective Pain    Able to rate subjective pain? yes  -WS      Pre-Treatment Pain Level 1  -WS      Exercise 1    Exercise Name 1 scap ret   high and low  -WS      Reps 1 20  -WS      Additional Comments GTB  -WS      Exercise 2    Exercise Name 2 shld rolls  -WS      Cueing 2 Verbal  -WS      Reps 2 20  -WS      Additional Comments 4#  -WS      Exercise 3    Exercise Name 3 S/L ER  -WS      Cueing 3 Tactile  -WS      Sets 3 2  -WS      Reps 3 10  -WS      Additional Comments 2#  -WS      Exercise 4    Exercise Name 4 shoulder punch  -WS      Cueing 4 Tactile  -WS      Reps 4 20  -WS      Additional Comments 4#  -WS      Exercise 5    Exercise Name 5 supine shoulder flex with cane  -WS      Cueing 5 Verbal  -WS      Reps 5 10  -WS       Exercise 6    Exercise Name 6 UBE  -WS      Time (Minutes) 6 4  -WS      Exercise 7    Exercise Name 7 Lat pull down  -WS      Cueing 7 Demo  -WS      Reps 7 15  -WS      Additional Comments GTB  -WS      Exercise 8    Exercise Name 8 Biceps curls with scap retract positioning  -WS      Cueing 8 Demo  -WS      Reps 8 20  -WS      Additional Comments 4#, to reinforce posture  -WS      Exercise 9    Exercise Name 9 danae HZ abd w/yellow  -WS      Cueing 9 Demo  -WS      Reps 9 15  -WS      Additional Comments yellow  -WS        User Key  (r) = Recorded By, (t) = Taken By, (c) = Cosigned By    Initials Name Provider Type    SHAYNE Car PTA Physical Therapy Assistant                        Manual Rx (last 36 hours)      Manual Treatments       01/10/18 1100          Manual Rx 1    Manual Rx 1 Location R shoulder in supine  -WS      Manual Rx 1 Type PROM, gh jt mobs, oscillations, PNF D2  -WS        User Key  (r) = Recorded By, (t) = Taken By, (c) = Cosigned By    Initials Name Provider Type     Vimal Car PTA Physical Therapy Assistant                PT OP Goals       01/10/18 1100       PT Short Term Goals    STG Date to Achieve 12/28/17  -WS     STG 1 Patient will be independent and compliant with initial HEP   -WS     STG 1 Progress Met  -WS     STG 2 Pt will demonstrate optimal shoulder-scapula postural alignment to reduce shoulder strain/pain.  -WS     STG 2 Progress Ongoing  -WS     STG 3 Pt will reports 10% decrease in pain at night.  -WS     STG 3 Progress Met  -     Long Term Goals    LTG Date to Achieve 01/14/18  -WS     LTG 1 Pt will be independent with advanced HEP for shoulder strengthening/stabilization.  -WS     LTG 1 Progress New  -WS     LTG 2 Pt will report decrease in pain and sleep disruption at night by 50%.  -WS     LTG 2 Progress New  -     LTG 3 Education for posture and body mechanics with home and work to reduce strain on shoulder.  -WS     LTG 3 Progress New  -     LTG  4 Pt will be able to throw a ball with no more than 1/10 pain.  -     LTG 4 Progress New  -WS     LTG 5 Pt will be able to reach behind back for ease of dressing/grooming.  -     LTG 5 Progress New  -WS       User Key  (r) = Recorded By, (t) = Taken By, (c) = Cosigned By    Initials Name Provider Type    SHAYNE Car PTA Physical Therapy Assistant          Therapy Education  Given: HEP, Symptoms/condition management, Pain management, Posture/body mechanics  Program: Reinforced  How Provided: Verbal  Provided to: Patient              Time Calculation:   Start Time: 1100  Stop Time: 1135  Time Calculation (min): 35 min    Therapy Charges for Today     Code Description Service Date Service Provider Modifiers Qty    37540445039 HC PT THER PROC EA 15 MIN 1/10/2018 Vimal Car PTA GP 2    01980265044 HC PT HOT OR COLD PACK TREAT MCARE 1/10/2018 Vimal Car PTA GP 1                    Vimal Car PTA  1/10/2018

## 2018-01-17 ENCOUNTER — HOSPITAL ENCOUNTER (OUTPATIENT)
Dept: PHYSICAL THERAPY | Facility: HOSPITAL | Age: 68
Setting detail: THERAPIES SERIES
Discharge: HOME | End: 2018-01-17

## 2018-01-17 DIAGNOSIS — M25.511 RIGHT SHOULDER PAIN, UNSPECIFIED CHRONICITY: ICD-10-CM

## 2018-01-17 DIAGNOSIS — M75.41 IMPINGEMENT SYNDROME, SHOULDER, RIGHT: Primary | ICD-10-CM

## 2018-01-17 DIAGNOSIS — M12.811 ROTATOR CUFF ARTHROPATHY, RIGHT: ICD-10-CM

## 2018-01-17 PROCEDURE — 97112 NEUROMUSCULAR REEDUCATION: CPT

## 2018-01-17 PROCEDURE — G8985 CARRY GOAL STATUS: HCPCS

## 2018-01-17 PROCEDURE — 97140 MANUAL THERAPY 1/> REGIONS: CPT

## 2018-01-17 PROCEDURE — G8984 CARRY CURRENT STATUS: HCPCS

## 2018-01-17 PROCEDURE — 97110 THERAPEUTIC EXERCISES: CPT

## 2018-01-17 NOTE — THERAPY PROGRESS REPORT/RE-CERT
Outpatient Physical Therapy Ortho Progress Note  [facility]     Patient Name: Brijesh Farmer  : 1950  MRN: [id number]  Today's Date: 2018      Visit Date: 2018    Patient Active Problem List   Diagnosis   • Asplenia   • Benign prostatic hyperplasia without lower urinary tract symptoms        Past Medical History:   Diagnosis Date   • Arm pain    • Vision changes    • Weight gain         Past Surgical History:   Procedure Laterality Date   • KNEE ACL RECONSTRUCTION Left        Visit Dx:     ICD-10-CM ICD-9-CM   1. Impingement syndrome, shoulder, right M75.41 726.2   2. Rotator cuff arthropathy, right M12.811 716.81   3. Right shoulder pain, unspecified chronicity M25.511 719.41                 PT Ortho       18 1200    Right Shoulder    Int Rotation Gross Movement (4-/5) good minus  -JA    Ext Rotation Gross Movement (4-/5) good minus  -JA      18 1100    Shoulder Impingement/Rotator Cuff Special Tests    Steward-Dieter Test (RC Lesion vs. Bursitis) Right:   mildly positive  -JA    Neer Impingement Test (RC Lesion vs. Bursitis) Right:;Positive   less painful than initially  -JA    Full Can Test (RC Lesion) Right:;Positive   less painful than initially  -JA    Empty Can Test (RC Lesion) Right:;Positive   less pain than initially  -JA    Right Shoulder    Flexion AROM Deficit 145  -JA    Flexion PROM Deficit 145  -JA    ABduction AROM Deficit 150  -JA    External Rotation AROM Deficit reaches C7  -JA    Internal Rotation AROM Deficit T10  -JA      User Key  (r) = Recorded By, (t) = Taken By, (c) = Cosigned By    Initials Name Provider Type    ROSS Salguero, PT Physical Therapist                      Therapy Education  Education Details: Discussed decreasing to every other week to accomdate his RTW; required copious cuing for scapular positioining for serratus punches and SL ER.  Given: HEP, Symptoms/condition management, Posture/body mechanics  Program:  Progressed  How Provided: Verbal, Demonstration  Provided to: Patient  Level of Understanding: Teach back education performed           PT OP Goals       01/17/18 1700       PT Short Term Goals    STG Date to Achieve 12/28/17  -JA     STG 1 Patient will be independent and compliant with initial HEP   -JA     STG 1 Progress Met  -JA     STG 2 Pt will demonstrate optimal shoulder-scapula postural alignment to reduce shoulder strain/pain.  -JA     STG 2 Progress Ongoing  -JA     STG 3 Pt will reports 10% decrease in pain at night.  -     STG 3 Progress Met  -     Long Term Goals    LTG Date to Achieve 01/14/18  -JA     LTG 1 Pt will be independent with advanced HEP for shoulder strengthening/stabilization.  -JA     LTG 1 Progress Progressing  -JA     LTG 1 Progress Comments progressed with tband for scapular strengthening  -JA     LTG 2 Pt will report decrease in pain and sleep disruption at night by 50%.  -JA     LTG 2 Progress Progressing  -JA     LTG 2 Progress Comments pt reports he can sleep on his shoulder longer now, but that it may be keeping him sore from being on it  -JA     LTG 3 Education for posture and body mechanics with home and work to reduce strain on shoulder.  -     LTG 3 Progress Progressing  -     LTG 3 Progress Comments still requires verbal and tactile cuing  -     LTG 4 Pt will be able to throw a ball with no more than 1/10 pain.  -     LTG 4 Progress Ongoing  -     LTG 5 Pt will be able to reach behind back for ease of dressing/grooming.  -     LTG 5 Progress Progressing  -     LTG 5 Progress Comments can reach higher with pain, but not yet into functional range  -ROSS       User Key  (r) = Recorded By, (t) = Taken By, (c) = Cosigned By    Initials Name Provider Type    ROSS Salguero, PT Physical Therapist                PT Assessment/Plan       01/17/18 1700       PT Assessment    Functional Limitations Limitation in home management;Limitations in community  activities;Limitations in functional capacity and performance;Performance in leisure activities;Performance in self-care ADL;Performance in sport activities  -ROSS     Impairments Pain;Range of motion;Muscle strength;Posture;Poor body mechanics  -ROSS     Assessment Comments Pt has been seen for a total of 6 visits for R arm/shoulder pain.  He demonstrates increasing AROM/PROM and reports improving function.  He notes he can lie on R side now with less pain.  Pt is not yet able to use for elevated tasks without pain which restricts participation in some ADL's and social activity.  he would benefit from continuing therapy .  -ROSS     Please refer to paper survey for additional self-reported information Yes  -JA     Rehab Potential Excellent  -ROSS     Patient/caregiver participated in establishment of treatment plan and goals Yes  -ROSS     Patient would benefit from skilled therapy intervention Yes  -JA     PT Plan    PT Frequency 1x/week   every other week  -ROSS     Predicted Duration of Therapy Intervention (days/wks) 4 weeks  -ROSS     Planned CPT's? PT THER PROC EA 15 MIN: 16322;PT MANUAL THERAPY EA 15 MIN: 15145;PT NEUROMUSC RE-EDUCATION EA 15 MIN: 53154;PT HOT OR COLD PACK TREAT MCARE  -ROSS     PT Plan Comments assess winging of scap, cont scapular stab, cont posture strengthening, RC, manual techniques including PNF  -ROSS       User Key  (r) = Recorded By, (t) = Taken By, (c) = Cosigned By    Initials Name Provider Type    ROSS Salguero, PT Physical Therapist                  Exercises       01/17/18 1100          Subjective Comments    Subjective Comments Didn't do the exercises till late yesterday, doubled up on them.  -ROSS      Subjective Pain    Able to rate subjective pain? yes  -ROSS      Pre-Treatment Pain Level 1  -ROSS      Exercise 1    Exercise Name 1 scap ret   high and low  -ROSS      Reps 1 20  -JA      Additional Comments GTB  -ROSS      Exercise 2    Exercise Name 2 shld rolls  -ROSS      Cueing 2 Verbal  -JA       Reps 2 20  -JA      Additional Comments no wt, copious tactile cues for rolling  -JA      Exercise 3    Exercise Name 3 S/L ER  -JA      Cueing 3 Tactile  -JA      Sets 3 2  -JA      Reps 3 10  -JA      Additional Comments 2#  -JA      Exercise 4    Exercise Name 4 shoulder punch  -JA      Cueing 4 Tactile  -JA      Sets 4 2  -JA      Reps 4 10  -JA      Additional Comments 4#  -JA      Exercise 6    Exercise Name 6 UBE  -JA      Time (Minutes) 6 4  -JA      Exercise 7    Exercise Name 7 Lat pull down  -JA      Cueing 7 Demo  -JA      Reps 7 20  -JA      Additional Comments GTB  -JA      Exercise 9    Exercise Name 9 danae HZ abd  -JA      Cueing 9 Demo  -JA      Reps 9 20  -JA      Additional Comments red/orange  -JA        User Key  (r) = Recorded By, (t) = Taken By, (c) = Cosigned By    Initials Name Provider Type    ROSS Salguero PT Physical Therapist           Manual Rx (last 36 hours)      Manual Treatments       01/17/18 1700          Manual Rx 1    Manual Rx 1 Location R shoulder in supine  -JA      Manual Rx 1 Type PROM, gh jt mobs, oscillations, PNF D2  -JA      Manual Rx 2    Manual Rx 2 Location R shoulder  -JA      Manual Rx 2 Type Multiple angle isometrics for ER/IR, rhythmic stabilization at 90 degrees flexion, D2 slow reversal  -JA        User Key  (r) = Recorded By, (t) = Taken By, (c) = Cosigned By    Initials Name Provider Type    ROSS Salguero PT Physical Therapist                                Time Calculation:   Start Time: 1130  Stop Time: 1220  Time Calculation (min): 50 min     Therapy Charges for Today     Code Description Service Date Service Provider Modifiers Qty    40574485417 HC PT CARRY MOV HAND OBJ CURRENT 1/17/2018 Melyssa Salguero, PT GP, CI 1    73113110588 HC PT CARRY MOV HAND OBJ PROJECTED 1/17/2018 Melyssa Salguero, PT GP, CI 1    90773625568 HC PT THER PROC EA 15 MIN 1/17/2018 Melyssa Salguero, PT GP 1    49648861804 HC PT NEUROMUSC RE EDUCATION EA  15 MIN 1/17/2018 Melyssa Salguero, PT GP 1    85550617240 HC PT MANUAL THERAPY EA 15 MIN 1/17/2018 Melyssa Salguero, PT GP 1          PT G-Codes  PT Professional Judgement Used?: Yes  Functional Limitation: Carrying, moving and handling objects  Carrying, Moving and Handling Objects Current Status (): At least 1 percent but less than 20 percent impaired, limited or restricted  Carrying, Moving and Handling Objects Goal Status (): At least 1 percent but less than 20 percent impaired, limited or restricted         Melyssa Salguero, PT  1/17/2018

## 2018-01-31 ENCOUNTER — HOSPITAL ENCOUNTER (OUTPATIENT)
Dept: PHYSICAL THERAPY | Facility: HOSPITAL | Age: 68
Setting detail: THERAPIES SERIES
Discharge: HOME | End: 2018-01-31

## 2018-01-31 DIAGNOSIS — M75.41 IMPINGEMENT SYNDROME, SHOULDER, RIGHT: Primary | ICD-10-CM

## 2018-01-31 DIAGNOSIS — M25.511 RIGHT SHOULDER PAIN, UNSPECIFIED CHRONICITY: ICD-10-CM

## 2018-01-31 DIAGNOSIS — M12.811 ROTATOR CUFF ARTHROPATHY, RIGHT: ICD-10-CM

## 2018-01-31 PROCEDURE — 97112 NEUROMUSCULAR REEDUCATION: CPT

## 2018-01-31 PROCEDURE — 97110 THERAPEUTIC EXERCISES: CPT

## 2018-01-31 PROCEDURE — 97140 MANUAL THERAPY 1/> REGIONS: CPT

## 2018-02-02 NOTE — THERAPY TREATMENT NOTE
"    Outpatient Physical Therapy Ortho Treatment Note  [facility]     Patient Name: Brijesh Farmer  : 1950  MRN: [id number]  Today's Date: 2018      Visit Date: 2018    Visit Dx:    ICD-10-CM ICD-9-CM   1. Impingement syndrome, shoulder, right M75.41 726.2   2. Rotator cuff arthropathy, right M12.811 716.81   3. Right shoulder pain, unspecified chronicity M25.511 719.41       Patient Active Problem List   Diagnosis   • Asplenia   • Benign prostatic hyperplasia without lower urinary tract symptoms        Past Medical History:   Diagnosis Date   • Arm pain    • Vision changes    • Weight gain         Past Surgical History:   Procedure Laterality Date   • KNEE ACL RECONSTRUCTION Left 19 1300   PT Assessment   Assessment Comments Pt reports still able to lie on R side more easily and don coat but has pain if reaching near end range of shoulder movement.  Scapular awareness and control is improving however weakness continues so HEP was revised.     PT Plan   PT Plan Comments assess response to HEP update, mike ross, ADL's                 18 1100   Subjective Comments   Subjective Comments I have a little pain putting on my coat if I have to reach back farther.  Still have pain with certain movements.   Subjective Pain   Able to rate subjective pain? yes   Pre-Treatment Pain Level 1   Exercise 1   Exercise Name 1 UBE   Time (Minutes) 1 4   Additional Comments L2   Exercise 2   Exercise Name 2 shld rolls   Cueing 2 Verbal   Reps 2 20   Additional Comments cuing to smooth out motion   Exercise 3   Exercise Name 3 S/L ER   Cueing 3 Tactile   Sets 3 2   Reps 3 10   Additional Comments 3#   Exercise 4   Exercise Name 4 serratus punch   Cueing 4 Verbal   Sets 4 2   Reps 4 10   Additional Comments 5#   Exercise 5   Exercise Name 5 IR towel stretch   Reps 5 5   Time (Seconds) 5 10   Exercise 6   Exercise Name 6 Prone \"I\" and \"Y\"   Reps 6 10   Exercise 7   Exercise Name 7 " "Prone hz abd edge of table   Reps 7 20                         Manual Rx (last 36 hours)      Manual Treatments       01/31/18 1300          Manual Rx 1    Manual Rx 1 Location R shoulder in supine  -JA      Manual Rx 1 Type PROM, gh jt mobs, oscillations, PNF D2  -JA      Manual Rx 2    Manual Rx 2 Location R shoulder  -JA      Manual Rx 2 Type Multiple angle isometrics for ER/IR, rhythmic stabilization at 90 degrees flexion, D2 slow reversal  -        User Key  (r) = Recorded By, (t) = Taken By, (c) = Cosigned By    Initials Name Provider Type    ROSS Salguero, PT Physical Therapist          01/31/18 1300   PT Short Term Goals   STG Date to Achieve 12/28/17   STG 1 Patient will be independent and compliant with initial HEP    STG 1 Progress Met   STG 2 Pt will demonstrate optimal shoulder-scapula postural alignment to reduce shoulder strain/pain.   STG 2 Progress Progressing   STG 2 Progress Comments demonstrates increased awareness   STG 3 Pt will reports 10% decrease in pain at night.   STG 3 Progress Met   Long Term Goals   LTG Date to Achieve 01/14/18   LTG 1 Pt will be independent with advanced HEP for shoulder strengthening/stabilization.   LTG 1 Progress Progressing   LTG 1 Progress Comments added prone I/Y/T's   LTG 2 Pt will report decrease in pain and sleep disruption at night by 50%.   LTG 2 Progress Progressing   LTG 3 Education for posture and body mechanics with home and work to reduce strain on shoulder.   LTG 3 Progress Progressing   LTG 4 Pt will be able to throw a ball with no more than 1/10 pain.   LTG 4 Progress Ongoing   LTG 4 Progress Comments not yet   LTG 5 Pt will be able to reach behind back for ease of dressing/grooming.   LTG 5 Progress Progressing           Therapy Education  Education Details: Revised HEP: added prone \"I\" and \"T\", cont shld rolls and scap ret  Given: HEP, Symptoms/condition management, Pain management, Posture/body mechanics  Program: Progressed  How " Provided: Verbal, Demonstration, Written  Provided to: Patient  Level of Understanding: Teach back education performed              Time Calculation:   Start Time: 1130  Stop Time: 1215  Time Calculation (min): 45 min    Therapy Charges for Today     Code Description Service Date Service Provider Modifiers Qty    67016059834 HC PT THER PROC EA 15 MIN 1/31/2018 Melyssa Salguero, PT GP 1    26934952496 HC PT NEUROMUSC RE EDUCATION EA 15 MIN 1/31/2018 Melyssa Salguero, PT GP 1    23169624772 HC PT MANUAL THERAPY EA 15 MIN 1/31/2018 Melyssa Salguero PT GP 1                    Melyssa Salguero, PT  2/1/2018

## 2018-02-14 ENCOUNTER — APPOINTMENT (OUTPATIENT)
Dept: PHYSICAL THERAPY | Facility: HOSPITAL | Age: 68
End: 2018-02-14

## 2018-02-21 ENCOUNTER — HOSPITAL ENCOUNTER (OUTPATIENT)
Dept: PHYSICAL THERAPY | Facility: HOSPITAL | Age: 68
Setting detail: THERAPIES SERIES
Discharge: HOME | End: 2018-02-21

## 2018-02-21 DIAGNOSIS — M12.811 ROTATOR CUFF ARTHROPATHY, RIGHT: ICD-10-CM

## 2018-02-21 DIAGNOSIS — M25.511 RIGHT SHOULDER PAIN, UNSPECIFIED CHRONICITY: ICD-10-CM

## 2018-02-21 DIAGNOSIS — M75.41 IMPINGEMENT SYNDROME, SHOULDER, RIGHT: Primary | ICD-10-CM

## 2018-02-21 PROCEDURE — G8985 CARRY GOAL STATUS: HCPCS

## 2018-02-21 PROCEDURE — 97110 THERAPEUTIC EXERCISES: CPT

## 2018-02-21 PROCEDURE — G8986 CARRY D/C STATUS: HCPCS

## 2018-02-21 PROCEDURE — 97140 MANUAL THERAPY 1/> REGIONS: CPT

## 2018-02-21 PROCEDURE — 97112 NEUROMUSCULAR REEDUCATION: CPT

## 2018-02-21 NOTE — THERAPY DISCHARGE NOTE
Outpatient Physical Therapy Ortho Treatment Note/Discharge Summary  [facility]     Patient Name: Brijesh Farmer  : 1950  MRN: [id number]  Today's Date: 2018      Visit Date: 2018    Visit Dx:    ICD-10-CM ICD-9-CM   1. Impingement syndrome, shoulder, right M75.41 726.2   2. Rotator cuff arthropathy, right M12.811 716.81   3. Right shoulder pain, unspecified chronicity M25.511 719.41       Patient Active Problem List   Diagnosis   • Asplenia   • Benign prostatic hyperplasia without lower urinary tract symptoms        Past Medical History:   Diagnosis Date   • Arm pain    • Vision changes    • Weight gain         Past Surgical History:   Procedure Laterality Date   • KNEE ACL RECONSTRUCTION Left                              PT Assessment/Plan       18 1400       PT Assessment    Assessment Comments Pt has been seen for a total of 8 visits for R shoulder pain and loss of ROM.  He progressed toward goals over 2 months and now reports no longer begin awakened by pain at night, no pain with dressing, and can throw a ball to his dog.  He has AROM internal rotation reaching behind his back but it is not to full range, however he will be able to continue working on this on his own.  he is ready for D/C from therapy to continue HEP on his own.  -ROSS     PT Plan    PT Plan Comments D/C  -ROSS       User Key  (r) = Recorded By, (t) = Taken By, (c) = Cosigned By    Initials Name Provider Type    ROSS Salguero, PT Physical Therapist                    Exercises       18 1400          Subjective Comments    Subjective Comments Had the flu so haven't the the exercises as much.  No pain gatting coat on or off.  -ROSS      Subjective Pain    Able to rate subjective pain? yes  -ROSS      Pre-Treatment Pain Level 0  -ROSS      Post-Treatment Pain Level 0  -ROSS      Exercise 1    Exercise Name 1 ZAKIAE  NOÉ      Additional Comments L3  -ROSS      Exercise 2    Exercise Name 2 shld rolls  -ROSS       "Cueing 2 Verbal  -JA      Reps 2 20  -JA      Additional Comments 4#  -JA      Exercise 3    Exercise Name 3 S/L ER  -JA      Cueing 3 Tactile  -JA      Sets 3 2  -JA      Reps 3 10  -JA      Additional Comments 4#  -JA      Exercise 4    Exercise Name 4 serratus punch  -JA      Cueing 4 Verbal  -JA      Reps 4 20  -JA      Additional Comments 5#  -JA      Exercise 5    Exercise Name 5 IR towel stretch  -JA      Reps 5 5  -JA      Time (Seconds) 5 10  -JA      Exercise 6    Exercise Name 6 Prone \"I\" and \"T\"  -JA      Reps 6 15  -JA      Additional Comments each  -JA      Exercise 7    Exercise Name 7 Prone flexion at edge of table  -JA      Reps 7 15  -JA        User Key  (r) = Recorded By, (t) = Taken By, (c) = Cosigned By    Initials Name Provider Type    ROSS Salguero, PT Physical Therapist                        Manual Rx (last 36 hours)      Manual Treatments       02/21/18 1500          Manual Rx 1    Manual Rx 1 Location R shoulder in supine  -      Manual Rx 1 Type PROM, gh jt mobs, oscillations, PNF D2  -JA      Manual Rx 2    Manual Rx 2 Location R shoulder  -JA      Manual Rx 2 Type Multiple angle isometrics for ER/IR, rhythmic stabilization at 90 degrees flexion, D2 slow reversal  -JA        User Key  (r) = Recorded By, (t) = Taken By, (c) = Cosigned By    Initials Name Provider Type    ROSS Salguero, PT Physical Therapist                PT OP Goals       02/21/18 1400       PT Short Term Goals    STG Date to Achieve 12/28/17  -JA     STG 1 Patient will be independent and compliant with initial HEP   -JA     STG 1 Progress Met  -JA     STG 2 Pt will demonstrate optimal shoulder-scapula postural alignment to reduce shoulder strain/pain.  -JA     STG 2 Progress Met  -JA     STG 3 Pt will reports 10% decrease in pain at night.  -JA     STG 3 Progress Met  -JA     Long Term Goals    LTG Date to Achieve 01/14/18  -JA     LTG 1 Pt will be independent with advanced HEP for shoulder " strengthening/stabilization.  -JA     LTG 1 Progress Met  -JA     LTG 2 Pt will report decrease in pain and sleep disruption at night by 50%.  -JA     LTG 2 Progress Met  -JA     LTG 2 Progress Comments able to sleep on R side without it bothering him  -JA     LTG 3 Education for posture and body mechanics with home and work to reduce strain on shoulder.  -JA     LTG 3 Progress Met  -JA     LTG 3 Progress Comments Pt demonstrates understanding of how posture and body mechanics relate to shoulder pain and function.  -JA     LTG 4 Pt will be able to throw a ball with no more than 1/10 pain.  -JA     LTG 4 Progress Met  -JA     LTG 4 Progress Comments has been throwing a ball to his dog, had used L for a while when R was painful but was able to start using R again.  -     LTG 5 Pt will be able to reach behind back for ease of dressing/grooming.  -     LTG 5 Progress Met;[facility] Comments not full yet but is able to reach behind back to T11 wth ease, understands he needs to continue stretching  -ROSS       User Key  (r) = Recorded By, (t) = Taken By, (c) = Cosigned By    Initials Name Provider Type    ROSS Salguero, PT Physical Therapist          Therapy Education  Education Details: Added prone flexion on edge of bed and IR stretch behind back using opposite hand to push R arm up.  Advised him to continue with HEP given 1/31/18 and the one today.  Pt knows to call with any questions/concerns if needed.  Given: HEP, Symptoms/condition management, Pain management, Posture/body mechanics  Program: Progressed  How Provided: Verbal, Demonstration, Written  Provided to: Patient  Level of Understanding: Teach back education performed    Outcome Measure Options: Disabilities of the Arm, Shoulder, and Hand (DASH)         Time Calculation:   Start Time: 1400  Stop Time: 1445  Time Calculation (min): 45 min    Therapy Charges for Today     Code Description Service Date Service Provider  Modifiers Qty    12434075762 HC PT CARRY MOV HAND OBJ PROJECTED 2/21/2018 Melyssa Salguero, PT GP, CI 1    77902364853 HC PT CARRY MOV HAND OBJ DISCHARGE 2/21/2018 Melyssa Salguero, PT GP, CI 1    85684866252 HC PT THER PROC EA 15 MIN 2/21/2018 Melyssa Salguero, PT GP 1    36485715839 HC PT MANUAL THERAPY EA 15 MIN 2/21/2018 Melyssa Salguero, PT GP 1    88105011193 HC PT NEUROMUSC RE EDUCATION EA 15 MIN 2/21/2018 Melyssa Salguero, PT GP 1          PT G-Codes  PT Professional Judgement Used?: Yes  Outcome Measure Options: Disabilities of the Arm, Shoulder, and Hand (DASH)  Functional Limitation: Carrying, moving and handling objects  Carrying, Moving and Handling Objects Goal Status (): At least 1 percent but less than 20 percent impaired, limited or restricted  Carrying, Moving and Handling Objects Discharge Status (): At least 1 percent but less than 20 percent impaired, limited or restricted     OP PT Discharge Summary  Date of Discharge: 02/21/18  Reason for Discharge: All goals achieved  Outcomes Achieved: Able to achieve all goals within established timeline  Discharge Destination: Home with home program  Discharge Instructions: see assessment      Melyssa Salguero, PT  2/21/2018

## 2020-05-20 NOTE — PROGRESS NOTES
Subjective   Brijesh Farmer is a 66 y.o. male.     Chief Complaint   Patient presents with   • Annual Exam     New Patient         HPI Comments: In for initial visit, transition of care, and annual preventative exam.  He sleeps about 6 hours per night.  No formal exercise but walks around a fair amount.  Used to do deep bends and pushups.  Energy is good.  Diet is well-balanced.       The following portions of the patient's history were reviewed and updated as appropriate: allergies, current medications, past social history and problem list.    HISTORY  No outpatient prescriptions have been marked as taking for the 12/12/17 encounter (Office Visit) with Jake Gongora MD.     Social History     Social History   • Marital status:      Spouse name: N/A   • Number of children: N/A   • Years of education: N/A     Occupational History   • Not on file.     Social History Main Topics   • Smoking status: Never Smoker   • Smokeless tobacco: Never Used   • Alcohol use Yes      Comment: 2 drinks x month on avg   • Drug use: Not on file   • Sexual activity: Not on file     Other Topics Concern   • Not on file     Social History Narrative   • No narrative on file     Family History   Problem Relation Age of Onset   • Heart failure Mother    • Diabetes Mother    • Heart failure Father    • No Known Problems Sister    • Mental retardation Brother    • No Known Problems Daughter    • No Known Problems Sister    • No Known Problems Sister    • No Known Problems Sister    • No Known Problems Brother    • No Known Problems Brother      Past Medical History:   Diagnosis Date   • Arm pain    • Vision changes    • Weight gain      Past Surgical History:   Procedure Laterality Date   • KNEE ACL RECONSTRUCTION Left 1984       Review of Systems   Constitutional: Negative for chills, fatigue and fever.   HENT: Negative for congestion, ear pain, nosebleeds, rhinorrhea, sore throat and voice change.    Eyes: Negative for discharge,  itching and visual disturbance.   Respiratory: Negative for cough, chest tightness, shortness of breath and wheezing.    Cardiovascular: Negative for chest pain, palpitations and leg swelling.   Gastrointestinal: Positive for diarrhea (over past 3 mos clearing up). Negative for abdominal pain, anal bleeding, constipation, nausea and vomiting.   Endocrine: Negative for cold intolerance, heat intolerance and polyuria.   Genitourinary: Negative for difficulty urinating, dysuria, frequency, hematuria and urgency.   Musculoskeletal: Positive for arthralgias (right shoulder for 6-9 mos). Negative for back pain and myalgias.   Skin: Negative for rash and wound.   Allergic/Immunologic: Positive for environmental allergies.   Neurological: Negative for dizziness, syncope, weakness, light-headedness and headaches.   Hematological: Negative for adenopathy. Does not bruise/bleed easily.   Psychiatric/Behavioral: Negative for confusion and sleep disturbance. The patient is not nervous/anxious.        Objective   Vitals:    12/12/17 1330   BP: 120/76   Pulse: 78   Resp: 16   Temp: 98.2 °F (36.8 °C)   SpO2: 96%      Last Weight    12/12/17  1330   Weight: 84.9 kg (187 lb 3.2 oz)    [unfilled]  Body mass index is 27.64 kg/(m^2).      Physical Exam   Constitutional: He is oriented to person, place, and time. He appears well-developed and well-nourished.   HENT:   Head: Normocephalic and atraumatic.   Right Ear: External ear normal.   Left Ear: External ear normal.   Nose: Nose normal.   Mouth/Throat: Oropharynx is clear and moist.   Eyes: Conjunctivae and EOM are normal. Pupils are equal, round, and reactive to light. No scleral icterus.   Neck: Normal range of motion. Neck supple. No JVD present. No thyromegaly present.   Cardiovascular: Normal rate, regular rhythm, normal heart sounds and intact distal pulses.  Exam reveals no gallop and no friction rub.    No murmur heard.  Pulmonary/Chest: Effort normal and breath sounds  normal. No respiratory distress. He has no wheezes. He has no rales.   Abdominal: Soft. Bowel sounds are normal. He exhibits no distension and no mass. There is no tenderness. There is no rebound and no guarding. No hernia.   Genitourinary: Rectum normal, prostate normal and penis normal.   Musculoskeletal: Normal range of motion. He exhibits no edema.   Lymphadenopathy:     He has no cervical adenopathy.   Neurological: He is alert and oriented to person, place, and time. He has normal reflexes. He displays normal reflexes.   Skin: Skin is warm and dry.   Psychiatric: He has a normal mood and affect. His behavior is normal. Judgment and thought content normal.   Nursing note and vitals reviewed.        Problem List Items Addressed This Visit        Immune and Lymphatic    Asplenia      Other Visit Diagnoses     Encounter for preventive health examination    -  Primary    Rotator cuff arthropathy, right            Assessment/Plan   In for initial visit, transition of care, and annual preventative exam.  He declines flu shot and tetanus booster today.  He's willing to have Pneumovax.  We'll start with her Prevnar this year and Pneumovax next year.  He like to try a cologuard for colorectal screening which is appropriate.  Annual wellness visit today.  He's had trouble with his right shoulder for about 6-9 months.  Some impingement testing with external rotation.  Set up for physical therapy for the right shoulder.  Likely rotator cuff tendinitis related to a fall some 6 months ago.  We'll check lab work today including CBC, CMP, lipids, PSA, UA.  We explained that Medicare does not cover routine preventative exams.  He is okay with that.  Also due for meningococcus and HIB.           Dragon disclaimer:   Much of this encounter note is an electronic transcription/translation of spoken language to printed text. The electronic translation of spoken language may permit erroneous, or at times, nonsensical words or phrases  to be inadvertently transcribed; Although I have reviewed the note for such errors, some may still exist.    Vermilion Border Text: The closure involved the vermilion border.